# Patient Record
Sex: MALE | Race: WHITE | NOT HISPANIC OR LATINO | Employment: OTHER | URBAN - METROPOLITAN AREA
[De-identification: names, ages, dates, MRNs, and addresses within clinical notes are randomized per-mention and may not be internally consistent; named-entity substitution may affect disease eponyms.]

---

## 2023-07-10 LAB
CANCER AG 19-9 (U/ML) IN SER/PLAS: 16.9 U/ML
CARCINOEMBRYONIC AG (NG/ML) IN SER/PLAS: <0.5 UG/L

## 2023-09-06 LAB
ALANINE AMINOTRANSFERASE (SGPT) (U/L) IN SER/PLAS: 24 U/L (ref 10–52)
ALBUMIN (G/DL) IN SER/PLAS: 4.5 G/DL (ref 3.4–5)
ALKALINE PHOSPHATASE (U/L) IN SER/PLAS: 48 U/L (ref 33–136)
ANION GAP IN SER/PLAS: 13 MMOL/L (ref 10–20)
APPEARANCE, URINE: NORMAL
ASCORBIC ACID: NORMAL MG/DL
ASPARTATE AMINOTRANSFERASE (SGOT) (U/L) IN SER/PLAS: 24 U/L (ref 9–39)
BASOPHILS (10*3/UL) IN BLOOD BY AUTOMATED COUNT: 0.04 X10E9/L (ref 0–0.1)
BASOPHILS/100 LEUKOCYTES IN BLOOD BY AUTOMATED COUNT: 0.6 % (ref 0–2)
BILIRUBIN TOTAL (MG/DL) IN SER/PLAS: 0.6 MG/DL (ref 0–1.2)
BILIRUBIN, URINE: NORMAL
BLOOD, URINE: NORMAL
CALCIUM (MG/DL) IN SER/PLAS: 9.6 MG/DL (ref 8.6–10.3)
CARBON DIOXIDE, TOTAL (MMOL/L) IN SER/PLAS: 28 MMOL/L (ref 21–32)
CHLORIDE (MMOL/L) IN SER/PLAS: 104 MMOL/L (ref 98–107)
CHOLESTEROL (MG/DL) IN SER/PLAS: 214 MG/DL (ref 0–199)
CHOLESTEROL IN HDL (MG/DL) IN SER/PLAS: 61.8 MG/DL
CHOLESTEROL/HDL RATIO: 3.5
COLOR, URINE: NORMAL
CREATININE (MG/DL) IN SER/PLAS: 0.98 MG/DL (ref 0.5–1.3)
EOSINOPHILS (10*3/UL) IN BLOOD BY AUTOMATED COUNT: 0.06 X10E9/L (ref 0–0.4)
EOSINOPHILS/100 LEUKOCYTES IN BLOOD BY AUTOMATED COUNT: 0.9 % (ref 0–6)
ERYTHROCYTE DISTRIBUTION WIDTH (RATIO) BY AUTOMATED COUNT: 12.9 % (ref 11.5–14.5)
ERYTHROCYTE MEAN CORPUSCULAR HEMOGLOBIN CONCENTRATION (G/DL) BY AUTOMATED: 33.1 G/DL (ref 32–36)
ERYTHROCYTE MEAN CORPUSCULAR VOLUME (FL) BY AUTOMATED COUNT: 88 FL (ref 80–100)
ERYTHROCYTES (10*6/UL) IN BLOOD BY AUTOMATED COUNT: 5.36 X10E12/L (ref 4.5–5.9)
GFR MALE: 77 ML/MIN/1.73M2
GLUCOSE (MG/DL) IN SER/PLAS: 74 MG/DL (ref 74–99)
GLUCOSE, URINE: NORMAL
HEMATOCRIT (%) IN BLOOD BY AUTOMATED COUNT: 47.4 % (ref 41–52)
HEMOGLOBIN (G/DL) IN BLOOD: 15.7 G/DL (ref 13.5–17.5)
IMMATURE GRANULOCYTES/100 LEUKOCYTES IN BLOOD BY AUTOMATED COUNT: 0.4 % (ref 0–0.9)
KETONES, URINE: NORMAL
LDL: 115 MG/DL (ref 0–99)
LEUKOCYTE ESTERASE, URINE: NORMAL
LEUKOCYTES (10*3/UL) IN BLOOD BY AUTOMATED COUNT: 7 X10E9/L (ref 4.4–11.3)
LYMPHOCYTES (10*3/UL) IN BLOOD BY AUTOMATED COUNT: 0.86 X10E9/L (ref 0.8–3)
LYMPHOCYTES/100 LEUKOCYTES IN BLOOD BY AUTOMATED COUNT: 12.3 % (ref 13–44)
MONOCYTES (10*3/UL) IN BLOOD BY AUTOMATED COUNT: 0.64 X10E9/L (ref 0.05–0.8)
MONOCYTES/100 LEUKOCYTES IN BLOOD BY AUTOMATED COUNT: 9.1 % (ref 2–10)
NEUTROPHILS (10*3/UL) IN BLOOD BY AUTOMATED COUNT: 5.37 X10E9/L (ref 1.6–5.5)
NEUTROPHILS/100 LEUKOCYTES IN BLOOD BY AUTOMATED COUNT: 76.7 % (ref 40–80)
NITRITE, URINE: NORMAL
PH, URINE: NORMAL
PLATELETS (10*3/UL) IN BLOOD AUTOMATED COUNT: 182 X10E9/L (ref 150–450)
POTASSIUM (MMOL/L) IN SER/PLAS: 4.3 MMOL/L (ref 3.5–5.3)
PROSTATE SPECIFIC AG (NG/ML) IN SER/PLAS: 0.05 NG/ML (ref 0–4)
PROTEIN TOTAL: 7 G/DL (ref 6.4–8.2)
PROTEIN, URINE: NORMAL
SODIUM (MMOL/L) IN SER/PLAS: 141 MMOL/L (ref 136–145)
SPECIFIC GRAVITY, URINE: NORMAL
THYROTROPIN (MIU/L) IN SER/PLAS BY DETECTION LIMIT <= 0.05 MIU/L: 2.05 MIU/L (ref 0.44–3.98)
TRIGLYCERIDE (MG/DL) IN SER/PLAS: 188 MG/DL (ref 0–149)
UREA NITROGEN (MG/DL) IN SER/PLAS: 22 MG/DL (ref 6–23)
UROBILINOGEN, URINE: NORMAL
VLDL: 38 MG/DL (ref 0–40)

## 2023-09-07 LAB
CANCER AG 19-9 (U/ML) IN SER/PLAS: 14.84 U/ML
CARCINOEMBRYONIC AG (NG/ML) IN SER/PLAS: <0.5 UG/L

## 2024-01-18 RX ORDER — LOSARTAN POTASSIUM 25 MG/1
25 TABLET ORAL DAILY
COMMUNITY
End: 2024-01-19 | Stop reason: SDUPTHER

## 2024-01-18 RX ORDER — TAMSULOSIN HYDROCHLORIDE 0.4 MG/1
0.4 CAPSULE ORAL DAILY
COMMUNITY
End: 2024-01-19 | Stop reason: SDUPTHER

## 2024-01-19 ENCOUNTER — OFFICE VISIT (OUTPATIENT)
Dept: PRIMARY CARE | Facility: CLINIC | Age: 84
End: 2024-01-19
Payer: MEDICARE

## 2024-01-19 VITALS
WEIGHT: 163 LBS | HEART RATE: 66 BPM | DIASTOLIC BLOOD PRESSURE: 80 MMHG | RESPIRATION RATE: 16 BRPM | TEMPERATURE: 97.4 F | BODY MASS INDEX: 24.71 KG/M2 | HEIGHT: 68 IN | SYSTOLIC BLOOD PRESSURE: 122 MMHG

## 2024-01-19 DIAGNOSIS — I10 HYPERTENSION, UNSPECIFIED TYPE: ICD-10-CM

## 2024-01-19 DIAGNOSIS — M79.642 PAIN IN BOTH HANDS: ICD-10-CM

## 2024-01-19 DIAGNOSIS — C78.89 SECONDARY MALIGNANT NEOPLASM OF OTHER DIGESTIVE ORGANS (MULTI): ICD-10-CM

## 2024-01-19 DIAGNOSIS — J40 BRONCHITIS: ICD-10-CM

## 2024-01-19 DIAGNOSIS — M79.641 PAIN IN BOTH HANDS: ICD-10-CM

## 2024-01-19 DIAGNOSIS — J41.1 MUCOPURULENT CHRONIC BRONCHITIS (MULTI): ICD-10-CM

## 2024-01-19 DIAGNOSIS — Z51.0: ICD-10-CM

## 2024-01-19 DIAGNOSIS — C21.2: Primary | ICD-10-CM

## 2024-01-19 DIAGNOSIS — C61 PROSTATE CANCER (MULTI): ICD-10-CM

## 2024-01-19 DIAGNOSIS — I70.0 ATHEROSCLEROSIS OF AORTA (CMS-HCC): ICD-10-CM

## 2024-01-19 DIAGNOSIS — G62.9 NEUROPATHY: ICD-10-CM

## 2024-01-19 PROCEDURE — 99214 OFFICE O/P EST MOD 30 MIN: CPT | Performed by: FAMILY MEDICINE

## 2024-01-19 PROCEDURE — 3074F SYST BP LT 130 MM HG: CPT | Performed by: FAMILY MEDICINE

## 2024-01-19 PROCEDURE — 3079F DIAST BP 80-89 MM HG: CPT | Performed by: FAMILY MEDICINE

## 2024-01-19 PROCEDURE — 1159F MED LIST DOCD IN RCRD: CPT | Performed by: FAMILY MEDICINE

## 2024-01-19 RX ORDER — LEVOFLOXACIN 500 MG/1
500 TABLET, FILM COATED ORAL DAILY
Qty: 10 TABLET | Refills: 0 | Status: SHIPPED | OUTPATIENT
Start: 2024-01-19 | End: 2024-01-29

## 2024-01-19 RX ORDER — TAMSULOSIN HYDROCHLORIDE 0.4 MG/1
0.4 CAPSULE ORAL DAILY
Qty: 90 CAPSULE | Refills: 3 | Status: SHIPPED | OUTPATIENT
Start: 2024-01-19 | End: 2024-01-19 | Stop reason: SDUPTHER

## 2024-01-19 RX ORDER — IBUPROFEN 100 MG/5ML
1000 SUSPENSION, ORAL (FINAL DOSE FORM) ORAL DAILY
COMMUNITY

## 2024-01-19 RX ORDER — TAMSULOSIN HYDROCHLORIDE 0.4 MG/1
0.4 CAPSULE ORAL 2 TIMES DAILY
Qty: 180 CAPSULE | Refills: 3 | Status: SHIPPED | OUTPATIENT
Start: 2024-01-19 | End: 2024-05-01 | Stop reason: SDUPTHER

## 2024-01-19 RX ORDER — LOSARTAN POTASSIUM 25 MG/1
25 TABLET ORAL DAILY
Qty: 90 TABLET | Refills: 3 | Status: SHIPPED | OUTPATIENT
Start: 2024-01-19 | End: 2024-03-05 | Stop reason: WASHOUT

## 2024-01-19 RX ORDER — TITANIUM DIOXIDE, OCTINOXATE, ZINC OXIDE 4.61; 1.6; .78 G/40ML; G/40ML; G/40ML
1 CREAM TOPICAL
COMMUNITY

## 2024-01-19 RX ORDER — MUPIROCIN 20 MG/G
OINTMENT TOPICAL 3 TIMES DAILY
Qty: 22 G | Refills: 0 | Status: SHIPPED | OUTPATIENT
Start: 2024-01-19 | End: 2024-01-29

## 2024-01-19 NOTE — PATIENT INSTRUCTIONS
PRINT OUT PAPER LAB ORDER AND RX FOR PT.  RETURN IN AUGUST 2024 FOR AWV AND ANNUAL WELL VISIT.   GET LABS BEFORE ANNUAL VISIT.    GET XRAY OF RIGHT WRIST  START LEVAQUIN ANTIBIOTIC NOW FOR LEFT SIDED BRONCHITIS.

## 2024-01-19 NOTE — PROGRESS NOTES
Subjective   Patient ID: Eb Byrne is a 83 y.o. male who presents for No chief complaint on file..  Here for refills   COUGHING UP GREY PHLEGM X 4 WEEKS NOW.   FLU SHOT: NO  COVID IUTD. LAST TESTED 2 WEEKS AGO.    RSV: NO.     WRITTEN NOTE TO NOT SEND ANY RX TO PHARMACY.  HARD COPY RX ONLY.   PT HAS BEEN ON HIGH DOSE LEVAQUIN 750 IN PAST.    NATHAN TAMSULOSIN  MUPIROCIN OINTMENT 2% GLANS PENIS   WANTS CANCER SCREENING TESTS AND CBC.      ROS:  WHEEZING X 2 DAYS.      Med Refill        Review of Systems   Constitutional:         BURNING AND STABBING PAIN IN THE RT THUMB, HAND, INDEX FINGERS BILAT AND IN FEET.  COULD BE GOUT OR NEUROPATHY VS IMPINGEMENT vs OTHER.   All other systems reviewed and are negative.      Objective   Physical Exam  Vitals and nursing note reviewed.   Constitutional:       Appearance: Normal appearance.   HENT:      Head: Normocephalic.      Right Ear: Tympanic membrane, ear canal and external ear normal.      Left Ear: Tympanic membrane, ear canal and external ear normal.      Nose: Nose normal.      Mouth/Throat:      Mouth: Mucous membranes are moist.   Eyes:      Conjunctiva/sclera: Conjunctivae normal.      Pupils: Pupils are equal, round, and reactive to light.   Cardiovascular:      Rate and Rhythm: Normal rate and regular rhythm.      Pulses: Normal pulses.      Heart sounds: Normal heart sounds.      Comments: SYMMETRIC WRIST PULSES ULNAR AND RADIAL ARTERIES.    Pulmonary:      Effort: Pulmonary effort is normal.      Breath sounds: Normal breath sounds.      Comments: E/A CHANGES AT WHITNEY DELGADO  Abdominal:      General: Bowel sounds are normal.      Palpations: Abdomen is soft.      Comments: NO HARSHA   Musculoskeletal:         General: Normal range of motion.      Cervical back: Normal range of motion and neck supple.   Skin:     General: Skin is warm and dry.   Neurological:      General: No focal deficit present.      Comments: STEPS UP TO TABLE NO TROUBLES,  STUMBLES BUT CATCHES  HIMSELF COMING DOWN.     Psychiatric:         Mood and Affect: Mood normal.         Assessment/Plan   Diagnoses and all orders for this visit:  Malignant neoplasm of cloacogenic zone (CMS/HCC)  -     tamsulosin (Flomax) 0.4 mg 24 hr capsule; Take 1 capsule (0.4 mg) by mouth once daily.  Mucopurulent chronic bronchitis (CMS/HCC)  -     levoFLOXacin (Levaquin) 500 mg tablet; Take 1 tablet (500 mg) by mouth once daily for 10 days.  Atherosclerosis of aorta (CMS/HCC)  Hypertension, unspecified type  -     losartan (Cozaar) 25 mg tablet; Take 1 tablet (25 mg) by mouth once daily.  Prostate cancer (CMS/HCC)  -     tamsulosin (Flomax) 0.4 mg 24 hr capsule; Take 1 capsule (0.4 mg) by mouth once daily.  DXT (radiotherapy)             .VS

## 2024-01-26 ENCOUNTER — LAB (OUTPATIENT)
Dept: LAB | Facility: LAB | Age: 84
End: 2024-01-26
Payer: MEDICARE

## 2024-01-26 DIAGNOSIS — C21.2: ICD-10-CM

## 2024-01-26 DIAGNOSIS — I70.0 ATHEROSCLEROSIS OF AORTA (CMS-HCC): ICD-10-CM

## 2024-01-26 DIAGNOSIS — J41.1 MUCOPURULENT CHRONIC BRONCHITIS (MULTI): ICD-10-CM

## 2024-01-26 DIAGNOSIS — C78.89 SECONDARY MALIGNANT NEOPLASM OF OTHER DIGESTIVE ORGANS (MULTI): ICD-10-CM

## 2024-01-26 DIAGNOSIS — Z51.0: ICD-10-CM

## 2024-01-26 DIAGNOSIS — I10 HYPERTENSION, UNSPECIFIED TYPE: ICD-10-CM

## 2024-01-26 DIAGNOSIS — C61 PROSTATE CANCER (MULTI): ICD-10-CM

## 2024-01-26 LAB — CANCER AG19-9 SERPL-ACNC: 12.74 U/ML

## 2024-01-26 PROCEDURE — 86301 IMMUNOASSAY TUMOR CA 19-9: CPT

## 2024-01-26 PROCEDURE — 36415 COLL VENOUS BLD VENIPUNCTURE: CPT

## 2024-02-07 DIAGNOSIS — J41.1 MUCOPURULENT CHRONIC BRONCHITIS (MULTI): ICD-10-CM

## 2024-02-08 RX ORDER — LEVOFLOXACIN 500 MG/1
TABLET, FILM COATED ORAL
Qty: 10 TABLET | Refills: 0 | OUTPATIENT
Start: 2024-02-08

## 2024-02-09 ENCOUNTER — PATIENT MESSAGE (OUTPATIENT)
Dept: PRIMARY CARE | Facility: CLINIC | Age: 84
End: 2024-02-09
Payer: MEDICARE

## 2024-02-09 DIAGNOSIS — J41.1 MUCOPURULENT CHRONIC BRONCHITIS (MULTI): Primary | ICD-10-CM

## 2024-02-09 RX ORDER — LEVOFLOXACIN 500 MG/1
TABLET, FILM COATED ORAL
Qty: 10 TABLET | Refills: 0 | Status: SHIPPED | OUTPATIENT
Start: 2024-02-09 | End: 2024-03-05 | Stop reason: WASHOUT

## 2024-02-09 RX ORDER — ALBUTEROL SULFATE 90 UG/1
2 AEROSOL, METERED RESPIRATORY (INHALATION) EVERY 4 HOURS PRN
Qty: 8.5 G | Refills: 0 | Status: SHIPPED | OUTPATIENT
Start: 2024-02-09 | End: 2025-02-08

## 2024-02-19 ENCOUNTER — OFFICE VISIT (OUTPATIENT)
Dept: PRIMARY CARE | Facility: CLINIC | Age: 84
End: 2024-02-19
Payer: MEDICARE

## 2024-02-19 VITALS
SYSTOLIC BLOOD PRESSURE: 140 MMHG | HEART RATE: 87 BPM | TEMPERATURE: 96.9 F | OXYGEN SATURATION: 94 % | RESPIRATION RATE: 16 BRPM | DIASTOLIC BLOOD PRESSURE: 84 MMHG

## 2024-02-19 DIAGNOSIS — I49.9 CARDIAC ARRHYTHMIA, UNSPECIFIED CARDIAC ARRHYTHMIA TYPE: ICD-10-CM

## 2024-02-19 DIAGNOSIS — I70.0 ATHEROSCLEROSIS OF AORTA (CMS-HCC): Primary | ICD-10-CM

## 2024-02-19 DIAGNOSIS — J41.1 MUCOPURULENT CHRONIC BRONCHITIS (MULTI): Primary | ICD-10-CM

## 2024-02-19 PROCEDURE — 1160F RVW MEDS BY RX/DR IN RCRD: CPT | Performed by: FAMILY MEDICINE

## 2024-02-19 PROCEDURE — 93000 ELECTROCARDIOGRAM COMPLETE: CPT | Performed by: FAMILY MEDICINE

## 2024-02-19 PROCEDURE — 1159F MED LIST DOCD IN RCRD: CPT | Performed by: FAMILY MEDICINE

## 2024-02-19 PROCEDURE — 99214 OFFICE O/P EST MOD 30 MIN: CPT | Performed by: FAMILY MEDICINE

## 2024-02-19 RX ORDER — AMOXICILLIN 875 MG/1
875 TABLET, FILM COATED ORAL 2 TIMES DAILY
Qty: 20 TABLET | Refills: 0 | Status: SHIPPED | OUTPATIENT
Start: 2024-02-19 | End: 2024-03-05 | Stop reason: WASHOUT

## 2024-02-19 ASSESSMENT — ENCOUNTER SYMPTOMS: COUGH: 1

## 2024-02-19 NOTE — PATIENT INSTRUCTIONS
1--REFER TO CARDIOLOGY FOR ABNORMAL HEART RHYTHM.   2--GET GET CHEST X-RAY. AND PULMONARY FUNCTION TESTING.   3--KEEP ON INHALER MEDICATIONS.    4--CAUTION:  GOOD RX CAN GIVE GOOD PRICE YET LOSE SAFETY OVERSIGHT AND CLEARING HOUSE EFFECT.    5--LABS

## 2024-02-19 NOTE — PROGRESS NOTES
Subjective   Patient ID: Eb Byrne is a 83 y.o. male who presents for Cough (STILL HAS COUGH ).  STILL HAS NUISANCE TYPE COUGH.   RAN OUT OF MEDICATION.   THE 4TH BRONCHITIS.    ? DDX:  ASTHMA ALLERGIES GERD MED SFX vs OTHER.      Cough    PT IS NOT USING INHALER ALBUTEROL   SAYS HE NEEDS 4X LEVAQUIN TO CLEAR THINGS.    NO ACE BUT OFF LOSARTAN?  ASKS FOR GOOD RX PAPER RX.    STOPPED FOR COUGH.  NO ACEI NOT ARB.      1--RESTART THE ARB  2--REFILL THE ABX BUT RISK OF QTC CHANGES ON Fqs.   PERHAPS STILL SLOWLY RECOVERING FROM WHITNEY PROCESS/URI.    3--ABNL HEART RHYTHM TODAY:  ECG:  VECs, LAE; SINUS 89  FQ COULD CONTRIBUTE TO THIS CHANGE AND  [id number].      Review of Systems   Respiratory:  Positive for cough.    All other systems reviewed and are negative.      Objective   Physical Exam  Vitals and nursing note reviewed.   Constitutional:       Appearance: Normal appearance.      Comments: Pt looks well today and clearing throat cough at start of visit,  dot of blood vessesl umbo rt tm and vessels at bony landmarks left tm o/w clear heent,  CVS:  3 BEATS THEN A PAUSE CONSISTENTLY, e/a changes at WHITNEY noted but NO dep edema on exam o/w clear abd neuro skin.     HENT:      Head: Normocephalic.      Right Ear: Ear canal and external ear normal.      Left Ear: Ear canal and external ear normal.      Nose: Nose normal.      Mouth/Throat:      Mouth: Mucous membranes are dry.   Eyes:      Conjunctiva/sclera: Conjunctivae normal.      Pupils: Pupils are equal, round, and reactive to light.   Cardiovascular:      Rate and Rhythm: Normal rate. Rhythm irregular.      Heart sounds: Normal heart sounds. No murmur heard.  Pulmonary:      Effort: Pulmonary effort is normal.      Breath sounds: Normal breath sounds.   Abdominal:      General: Bowel sounds are normal.      Palpations: Abdomen is soft.   Musculoskeletal:         General: Normal range of motion.      Cervical back: Normal range of motion and neck supple.    Skin:     General: Skin is warm and dry.   Neurological:      General: No focal deficit present.   Psychiatric:         Thought Content: Thought content normal.         Assessment/Plan   Diagnoses and all orders for this visit:  Mucopurulent chronic bronchitis (CMS/HCC)  -     XR chest 2 views; Future  -     Complete Pulmonary Function Test (Spirometry/DLCO/Lung Volumes); Future  Cardiac arrhythmia, unspecified cardiac arrhythmia type  -     ECG 12 lead (Clinic Performed)             .VS

## 2024-03-05 ENCOUNTER — OFFICE VISIT (OUTPATIENT)
Dept: CARDIOLOGY | Facility: CLINIC | Age: 84
End: 2024-03-05
Payer: MEDICARE

## 2024-03-05 ENCOUNTER — HOSPITAL ENCOUNTER (OUTPATIENT)
Dept: CARDIOLOGY | Facility: CLINIC | Age: 84
Discharge: HOME | End: 2024-03-05
Payer: MEDICARE

## 2024-03-05 VITALS
BODY MASS INDEX: 24.32 KG/M2 | HEIGHT: 68 IN | DIASTOLIC BLOOD PRESSURE: 75 MMHG | WEIGHT: 160.5 LBS | SYSTOLIC BLOOD PRESSURE: 132 MMHG | HEART RATE: 77 BPM

## 2024-03-05 DIAGNOSIS — I49.3 PVC (PREMATURE VENTRICULAR CONTRACTION): ICD-10-CM

## 2024-03-05 DIAGNOSIS — I49.9 CARDIAC ARRHYTHMIA, UNSPECIFIED CARDIAC ARRHYTHMIA TYPE: Primary | ICD-10-CM

## 2024-03-05 DIAGNOSIS — I49.9 CARDIAC ARRHYTHMIA, UNSPECIFIED CARDIAC ARRHYTHMIA TYPE: ICD-10-CM

## 2024-03-05 PROBLEM — I10 ESSENTIAL HYPERTENSION: Status: ACTIVE | Noted: 2024-03-05

## 2024-03-05 PROCEDURE — 93244 EXT ECG>48HR<7D REV&INTERPJ: CPT | Performed by: INTERNAL MEDICINE

## 2024-03-05 PROCEDURE — 3078F DIAST BP <80 MM HG: CPT | Performed by: INTERNAL MEDICINE

## 2024-03-05 PROCEDURE — 99213 OFFICE O/P EST LOW 20 MIN: CPT | Mod: 25 | Performed by: INTERNAL MEDICINE

## 2024-03-05 PROCEDURE — 93242 EXT ECG>48HR<7D RECORDING: CPT

## 2024-03-05 PROCEDURE — 3075F SYST BP GE 130 - 139MM HG: CPT | Performed by: INTERNAL MEDICINE

## 2024-03-05 PROCEDURE — 99203 OFFICE O/P NEW LOW 30 MIN: CPT | Performed by: INTERNAL MEDICINE

## 2024-03-05 PROCEDURE — 1159F MED LIST DOCD IN RCRD: CPT | Performed by: INTERNAL MEDICINE

## 2024-03-05 PROCEDURE — 1160F RVW MEDS BY RX/DR IN RCRD: CPT | Performed by: INTERNAL MEDICINE

## 2024-03-05 NOTE — PROGRESS NOTES
"Chief Complaint:   PVC's     History of Present Illness     Eb Byrne is a 83 y.o. male presenting with PVC's.  No palpitations.  ECG 2/19/24 frequent unifocal PVC's, ASX.  No cardiac Hx.  CAC=2 in 2023.  No angina or GARCIA. No Hx syncope. No HTN, HPL, DM, non-smoker.  EtOH social.  Caffeine none. Was being Rx for URI.       Previous History     Past Medical History:  He has a past medical history of Anxiety disorder, unspecified (07/10/2015), Benign prostatic hyperplasia without lower urinary tract symptoms (07/17/2017), Disorder of prostate, unspecified (03/09/2015), Essential hypertension (03/05/2024), Fear of flying (01/27/2015), Insomnia, unspecified (10/09/2017), and PVC (premature ventricular contraction) (03/05/2024).    Past Surgical History:  He has a past surgical history that includes Prostate surgery (06/09/2014); Hernia repair (02/16/2017); and Colonoscopy (11/30/2017).      Social History:  He reports that he has quit smoking. His smoking use included cigarettes. He does not have any smokeless tobacco history on file. No history on file for alcohol use and drug use.    Family History:  No family history on file.     Allergies:  Patient has no known allergies.    Outpatient Medications:  Current Outpatient Medications   Medication Instructions    albuterol (ProAir HFA) 90 mcg/actuation inhaler 2 puffs, inhalation, Every 4 hours PRN    ascorbic acid (VITAMIN C) 1,000 mg, oral, Daily    cranberry 400 mg capsule 1 capsule, oral, Daily RT    levoFLOXacin (Levaquin) 500 mg tablet TAKE 1 TABLET BY MOUTH EVERY DAY FOR 10 DAYS    losartan (COZAAR) 25 mg, oral, Daily    mv-min-folic-K1-lycopen-lutein 188--300 mcg tablet 1 tablet, oral, Daily    tamsulosin (FLOMAX) 0.4 mg, oral, 2 times daily    ZINC ACETATE ORAL 1 tablet, oral, Daily       Physical Examination   Vitals:  Visit Vitals  /75 (BP Location: Right arm, Patient Position: Sitting, BP Cuff Size: Adult)   Pulse 77   Ht 1.727 m (5' 8\") "   Wt 72.8 kg (160 lb 8 oz)   BMI 24.40 kg/m²   Smoking Status Former   BSA 1.87 m²      Physical Exam  Vitals reviewed.   Constitutional:       General: He is not in acute distress.     Appearance: Normal appearance.   HENT:      Head: Normocephalic and atraumatic.      Nose: Nose normal.   Eyes:      Conjunctiva/sclera: Conjunctivae normal.   Cardiovascular:      Rate and Rhythm: Normal rate and regular rhythm.      Pulses: Normal pulses.      Heart sounds: No murmur heard.  Pulmonary:      Effort: Pulmonary effort is normal. No respiratory distress.      Breath sounds: Normal breath sounds. No wheezing, rhonchi or rales.   Abdominal:      General: Bowel sounds are normal. There is no distension.      Palpations: Abdomen is soft.      Tenderness: There is no abdominal tenderness.   Musculoskeletal:         General: No swelling.      Right lower leg: No edema.      Left lower leg: No edema.   Skin:     General: Skin is warm and dry.      Capillary Refill: Capillary refill takes less than 2 seconds.   Neurological:      General: No focal deficit present.      Mental Status: He is alert.   Psychiatric:         Mood and Affect: Mood normal.            Labs/Imaging/Cardiac Studies     Last Labs:  CBC -  Lab Results   Component Value Date    WBC 7.0 09/06/2023    HGB 15.7 09/06/2023    HCT 47.4 09/06/2023    MCV 88 09/06/2023     09/06/2023       CMP -  Lab Results   Component Value Date    CALCIUM 9.6 09/06/2023    PROT 7.0 09/06/2023    ALBUMIN 4.5 09/06/2023    AST 24 09/06/2023    ALT 24 09/06/2023    ALKPHOS 48 09/06/2023    BILITOT 0.6 09/06/2023       LIPID PANEL -   Lab Results   Component Value Date    CHOL 214 (H) 09/06/2023    HDL 61.8 09/06/2023    CHHDL 3.5 09/06/2023    VLDL 38 09/06/2023    TRIG 188 (H) 09/06/2023       RENAL FUNCTION PANEL -   Lab Results   Component Value Date    K 4.3 09/06/2023       Lab Results   Component Value Date    HGBA1C 5.4 09/16/2022    HGBA1C 5.1 05/16/2022        ECG:    Echo:  No echocardiogram results found for the past 12 months       Assessment and Recommendations     Assessment/Plan   1. PVC (premature ventricular contraction)  Frequent ASX PVC's on recent ECG.  Holter x 7 days.  If frequent, then Echo.       Perry Hartmann MD    Exclusive of any other services or procedures performed, I, Perry Hartmann MD , spent 30 minutes in duration for this visit today.  This time consisted of chart review, obtaining history, and/or performing the exam as documented above as well as documenting the clinical information for the encounter in the electronic record, discussing treatment options, plans, and/or goals with patient, family, and/or caregiver, refilling medications, updating the electronic record, ordering medicines, lab work, imaging, referrals, and/or procedures as documented above and communicating with other [facility] professionals. I have discussed the results of laboratory, radiology, and cardiology studies with the patient and their family/caregiver.

## 2024-03-18 LAB — BODY SURFACE AREA: 1.87 M2

## 2024-03-20 DIAGNOSIS — I47.29 NSVT (NONSUSTAINED VENTRICULAR TACHYCARDIA) (MULTI): Primary | ICD-10-CM

## 2024-03-20 NOTE — TELEPHONE ENCOUNTER
----- Message from Perry Hartmann MD sent at 3/20/2024 11:27 AM EDT -----  Holter NSVT (7 beats), Frequent PVC's (4%) - Start Toprol XL 25 every day and do stress echo.  ----- Message -----  From: Perry Hartmann MD  Sent: 3/18/2024   2:57 PM EDT  To: Perry Hartmann MD

## 2024-03-21 RX ORDER — METOPROLOL SUCCINATE 25 MG/1
25 TABLET, EXTENDED RELEASE ORAL DAILY
Qty: 30 TABLET | Refills: 3 | Status: SHIPPED | OUTPATIENT
Start: 2024-03-21 | End: 2024-05-01 | Stop reason: SDUPTHER

## 2024-03-21 NOTE — TELEPHONE ENCOUNTER
Spoke with pt and pt agreed to taking metoprolol and verbalized understanding. Pt cannot walk on treadmill for a stress echo. Pt would like only an echo if possible but if you need a stress test done pt will have to be [place]. Please advise. Thank you

## 2024-05-01 ENCOUNTER — OFFICE VISIT (OUTPATIENT)
Dept: PRIMARY CARE | Facility: CLINIC | Age: 84
End: 2024-05-01
Payer: MEDICARE

## 2024-05-01 VITALS
OXYGEN SATURATION: 95 % | SYSTOLIC BLOOD PRESSURE: 133 MMHG | RESPIRATION RATE: 16 BRPM | TEMPERATURE: 97.4 F | HEART RATE: 62 BPM | DIASTOLIC BLOOD PRESSURE: 79 MMHG | WEIGHT: 161 LBS | BODY MASS INDEX: 24.48 KG/M2

## 2024-05-01 DIAGNOSIS — I47.29 NSVT (NONSUSTAINED VENTRICULAR TACHYCARDIA) (MULTI): ICD-10-CM

## 2024-05-01 DIAGNOSIS — C21.2: ICD-10-CM

## 2024-05-01 DIAGNOSIS — G62.9 NEUROPATHY: ICD-10-CM

## 2024-05-01 DIAGNOSIS — I10 HYPERTENSION, UNSPECIFIED TYPE: ICD-10-CM

## 2024-05-01 DIAGNOSIS — I70.0 ATHEROSCLEROSIS OF AORTA (CMS-HCC): Primary | ICD-10-CM

## 2024-05-01 DIAGNOSIS — I49.9 CARDIAC ARRHYTHMIA, UNSPECIFIED CARDIAC ARRHYTHMIA TYPE: ICD-10-CM

## 2024-05-01 DIAGNOSIS — C61 PROSTATE CANCER (MULTI): ICD-10-CM

## 2024-05-01 DIAGNOSIS — C78.89 SECONDARY MALIGNANT NEOPLASM OF OTHER DIGESTIVE ORGANS (MULTI): ICD-10-CM

## 2024-05-01 DIAGNOSIS — Z51.0: ICD-10-CM

## 2024-05-01 PROCEDURE — 1160F RVW MEDS BY RX/DR IN RCRD: CPT | Performed by: FAMILY MEDICINE

## 2024-05-01 PROCEDURE — 3078F DIAST BP <80 MM HG: CPT | Performed by: FAMILY MEDICINE

## 2024-05-01 PROCEDURE — 99213 OFFICE O/P EST LOW 20 MIN: CPT | Performed by: FAMILY MEDICINE

## 2024-05-01 PROCEDURE — 1159F MED LIST DOCD IN RCRD: CPT | Performed by: FAMILY MEDICINE

## 2024-05-01 PROCEDURE — 3075F SYST BP GE 130 - 139MM HG: CPT | Performed by: FAMILY MEDICINE

## 2024-05-01 RX ORDER — METOPROLOL SUCCINATE 25 MG/1
25 TABLET, EXTENDED RELEASE ORAL DAILY
Qty: 90 TABLET | Refills: 3 | Status: SHIPPED | OUTPATIENT
Start: 2024-05-01 | End: 2025-05-01

## 2024-05-01 RX ORDER — TAMSULOSIN HYDROCHLORIDE 0.4 MG/1
0.4 CAPSULE ORAL 2 TIMES DAILY
Qty: 180 CAPSULE | Refills: 3 | Status: SHIPPED | OUTPATIENT
Start: 2024-05-01 | End: 2025-05-01

## 2024-05-01 ASSESSMENT — ENCOUNTER SYMPTOMS
RESPIRATORY NEGATIVE: 1
PSYCHIATRIC NEGATIVE: 1
ENDOCRINE NEGATIVE: 1
NEUROLOGICAL NEGATIVE: 1
CARDIOVASCULAR NEGATIVE: 1
MUSCULOSKELETAL NEGATIVE: 1
EYES NEGATIVE: 1
CONSTITUTIONAL NEGATIVE: 1
GASTROINTESTINAL NEGATIVE: 1
HEMATOLOGIC/LYMPHATIC NEGATIVE: 1
ALLERGIC/IMMUNOLOGIC NEGATIVE: 1

## 2024-05-01 NOTE — PATIENT INSTRUCTIONS
Awv ? DUE JUNE OR SEPT 2024    GET THE CANCER MARKERS: CEA AND  NOW.      ROUTINE LABS WILL BE ORDERED SEPARATELY FOR SEPTEMBER 2024.      Look for and complete PREVNAR 20 STREP PNEUMONIA VACCINATION.

## 2024-05-01 NOTE — PROGRESS NOTES
Subjective   Patient ID: Eb Byrne is a 83 y.o. male who presents for Med Refill.  Med Refill      NEEDS REFILLS TO [facility] [address]   PAPER IN HAND.    ASKS FOR THE 2 BLOOD TESTS.      INTERVAL:   Message from Perry Hartmann MD sent at 3/20/2024 11:27 AM EDT -----Holter NSVT (7 beats), Frequent PVC's (4%) - Start Toprol XL 25 every day and do stress echo.    PT DOES NOT WANT TO DO THE STRESS TEST BUT I ENCOURAGED COMPLIANCE.        Review of Systems   Constitutional: Negative.    HENT: Negative.     Eyes: Negative.    Respiratory: Negative.     Cardiovascular: Negative.    Gastrointestinal: Negative.    Endocrine: Negative.    Genitourinary: Negative.         NOCTURIA X4   Musculoskeletal: Negative.    Skin: Negative.    Allergic/Immunologic: Negative.    Neurological: Negative.    Hematological: Negative.    Psychiatric/Behavioral: Negative.       OCCASIONAL BOWEL BLOCKAGE RELIEF WITH MAG CITRATE AND 6-7 HRS LATER ALL CLEAR.      Objective   Physical Exam  Vitals and nursing note reviewed.   Constitutional:       Appearance: Normal appearance.   HENT:      Head: Normocephalic.      Right Ear: Tympanic membrane, ear canal and external ear normal.      Left Ear: Tympanic membrane, ear canal and external ear normal.      Nose: Nose normal.      Mouth/Throat:      Mouth: Mucous membranes are moist.      Pharynx: Oropharynx is clear.   Eyes:      Conjunctiva/sclera: Conjunctivae normal.      Pupils: Pupils are equal, round, and reactive to light.   Cardiovascular:      Rate and Rhythm: Normal rate and regular rhythm.      Pulses: Normal pulses.      Heart sounds: Normal heart sounds.   Pulmonary:      Effort: Pulmonary effort is normal.      Breath sounds: Normal breath sounds.   Abdominal:      General: Bowel sounds are normal.      Palpations: Abdomen is soft.   Musculoskeletal:         General: Normal range of motion.      Cervical back: Normal range of motion and neck supple.   Skin:     General: Skin  is warm and dry.   Neurological:      General: No focal deficit present.      Mental Status: He is oriented to person, place, and time. Mental status is at baseline.   Psychiatric:         Mood and Affect: Mood normal.         Behavior: Behavior normal.         Thought Content: Thought content normal.         Judgment: Judgment normal.         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 Record:  [id number] 19 [employer]   Health Maintenance:  Colonoscopy - (2017) 2017-DR BUCKLEY     Flu Shot - REFUSED FLU SHOT   J&J Covid Vax - (5/15/2021) DONE MAY 2021  Pneumovax - TBA    HgbA1C Screening - (2022) may 2022: a1c: 5.1%      Bone Density Test - HX OF PROSTATE CANCER MAY NEED DEXA   Covid Vax - X1  cit-6 minicog test - (2023) :  missed one point:       Medical Problems:  Hypothyroidism, Anxiety, Dermatitis, Diverticulitis, Hematuria, Hypertension, Insomnia, Jejunitis, Erectile Dysfunction, Mild Vit D Deficiency, Prostate Carcinoma, Prostate Hypertrophy, PSA elevation, Radiation Injury, Fear Of Flying  Surgical Hx:  Inguinal Hernia Repair - 02/15/2017 DR HERNANDEZ   Prostate Surgery  Radiation TX - () UROLOGIST:  DR RUDD; HX OF HERNIA DR HERNANDEZ TREATED THAT. S/P RADIATION TX FOR PROSTATE CANCER     FH:  Son 1: FHX:  SON MED INDUCED COMA ETT Dx: SZ: ON LOTS OF MEDS [facility]....  FAMILY HX OF TYPE 2 DM ;  LONGEVITY     SH:  Occupation: Unemployed.Sleep: Reports continuity disturbances - PT WAKES UP  ABOUT 4 TIMES A NIGHT.Hand Dominance: Right-handed.  Personal Habits:  Cigarette Use: 3PPD Age 18 To 40 Yrs - (2019) QUIT 30 YR AGO , Former Cigarette Smoker.Alcohol: Occasional Alcoholic Beverage.Daily Caffeine: Daily Caffeine Use.Exercise Type: Exercises sporadically.

## 2024-05-16 ENCOUNTER — TELEPHONE (OUTPATIENT)
Dept: CARDIOLOGY | Facility: CLINIC | Age: 84
End: 2024-05-16
Payer: MEDICARE

## 2024-05-16 DIAGNOSIS — I49.3 PVC (PREMATURE VENTRICULAR CONTRACTION): Primary | ICD-10-CM

## 2024-05-16 DIAGNOSIS — I70.0 ATHEROSCLEROSIS OF AORTA (CMS-HCC): ICD-10-CM

## 2024-05-22 ENCOUNTER — HOSPITAL ENCOUNTER (OUTPATIENT)
Dept: CARDIOLOGY | Facility: CLINIC | Age: 84
Discharge: HOME | End: 2024-05-22
Payer: MEDICARE

## 2024-05-22 DIAGNOSIS — I70.0 ATHEROSCLEROSIS OF AORTA (CMS-HCC): ICD-10-CM

## 2024-05-22 DIAGNOSIS — R94.31 ABNORMAL ELECTROCARDIOGRAM (ECG) (EKG): ICD-10-CM

## 2024-05-22 DIAGNOSIS — I49.3 PVC (PREMATURE VENTRICULAR CONTRACTION): ICD-10-CM

## 2024-05-22 LAB
AORTIC VALVE PEAK VELOCITY: 1.66 M/S
AV PEAK GRADIENT: 11 MMHG
AVA (PEAK VEL): 2.38 CM2
EJECTION FRACTION APICAL 4 CHAMBER: 67.3
LEFT ATRIUM VOLUME AREA LENGTH INDEX BSA: 24.6 ML/M2
LEFT VENTRICLE INTERNAL DIMENSION DIASTOLE: 4.07 CM (ref 3.5–6)
LEFT VENTRICULAR OUTFLOW TRACT DIAMETER: 2.03 CM
LV EJECTION FRACTION BIPLANE: 68 %
MITRAL VALVE E/A RATIO: 0.68
MITRAL VALVE E/E' RATIO: 8.23
RIGHT VENTRICLE FREE WALL PEAK S': 12 CM/S
RIGHT VENTRICLE PEAK SYSTOLIC PRESSURE: 33.9 MMHG
TRICUSPID ANNULAR PLANE SYSTOLIC EXCURSION: 2 CM

## 2024-05-22 PROCEDURE — 93306 TTE W/DOPPLER COMPLETE: CPT | Performed by: INTERNAL MEDICINE

## 2024-05-22 PROCEDURE — 93306 TTE W/DOPPLER COMPLETE: CPT

## 2024-06-03 ENCOUNTER — LAB (OUTPATIENT)
Dept: LAB | Facility: LAB | Age: 84
End: 2024-06-03
Payer: MEDICARE

## 2024-06-03 DIAGNOSIS — C78.89 SECONDARY MALIGNANT NEOPLASM OF OTHER DIGESTIVE ORGANS (MULTI): ICD-10-CM

## 2024-06-03 DIAGNOSIS — C61 PROSTATE CANCER (MULTI): ICD-10-CM

## 2024-06-03 DIAGNOSIS — C21.2: ICD-10-CM

## 2024-06-03 LAB — CANCER AG19-9 SERPL-ACNC: 9.28 U/ML

## 2024-06-03 PROCEDURE — 36415 COLL VENOUS BLD VENIPUNCTURE: CPT

## 2024-06-03 PROCEDURE — 86301 IMMUNOASSAY TUMOR CA 19-9: CPT

## 2024-06-12 ENCOUNTER — APPOINTMENT (OUTPATIENT)
Dept: CARDIOLOGY | Facility: CLINIC | Age: 84
End: 2024-06-12
Payer: MEDICARE

## 2024-07-16 ENCOUNTER — APPOINTMENT (OUTPATIENT)
Dept: PRIMARY CARE | Facility: CLINIC | Age: 84
End: 2024-07-16
Payer: MEDICARE

## 2024-07-16 VITALS
RESPIRATION RATE: 16 BRPM | DIASTOLIC BLOOD PRESSURE: 78 MMHG | HEIGHT: 68 IN | WEIGHT: 163 LBS | SYSTOLIC BLOOD PRESSURE: 147 MMHG | BODY MASS INDEX: 24.71 KG/M2 | HEART RATE: 60 BPM

## 2024-07-16 DIAGNOSIS — I10 HYPERTENSION, UNSPECIFIED TYPE: Primary | ICD-10-CM

## 2024-07-16 DIAGNOSIS — I47.29 NSVT (NONSUSTAINED VENTRICULAR TACHYCARDIA) (MULTI): ICD-10-CM

## 2024-07-16 DIAGNOSIS — G44.86 CERVICOGENIC HEADACHE: ICD-10-CM

## 2024-07-16 PROCEDURE — 3078F DIAST BP <80 MM HG: CPT | Performed by: FAMILY MEDICINE

## 2024-07-16 PROCEDURE — 1159F MED LIST DOCD IN RCRD: CPT | Performed by: FAMILY MEDICINE

## 2024-07-16 PROCEDURE — 3077F SYST BP >= 140 MM HG: CPT | Performed by: FAMILY MEDICINE

## 2024-07-16 PROCEDURE — 1160F RVW MEDS BY RX/DR IN RCRD: CPT | Performed by: FAMILY MEDICINE

## 2024-07-16 PROCEDURE — 99214 OFFICE O/P EST MOD 30 MIN: CPT | Performed by: FAMILY MEDICINE

## 2024-07-16 RX ORDER — METOPROLOL SUCCINATE 25 MG/1
25 TABLET, EXTENDED RELEASE ORAL 2 TIMES DAILY
Qty: 180 TABLET | Refills: 3 | Status: SHIPPED | OUTPATIENT
Start: 2024-07-16 | End: 2025-07-16

## 2024-07-16 RX ORDER — MELOXICAM 15 MG/1
15 TABLET ORAL DAILY
Qty: 15 TABLET | Refills: 1 | Status: SHIPPED | OUTPATIENT
Start: 2024-07-16 | End: 2024-07-31

## 2024-07-16 ASSESSMENT — ENCOUNTER SYMPTOMS
OCCASIONAL FEELINGS OF UNSTEADINESS: 0
DEPRESSION: 0
LOSS OF SENSATION IN FEET: 1

## 2024-07-16 NOTE — PATIENT INSTRUCTIONS
INCREASED METOPROLOL SUCCINATE XL 25 / TOPROL XL 25 MG FROM DAILY TO TWICE DAILY TODAY.    USE [employer].  CALL FOR NEEDS [phone].   KEEP ON MEDICATIONS  KEEP SPECIALTY APPOINTMENTS.

## 2024-07-16 NOTE — PROGRESS NOTES
Subjective   Patient ID: Eb Byrne is a 83 y.o. male who presents for Head Injury (Three weeks ago, headaches and stabbing pain in head. ).  Head Injury       Head Injury (Three weeks ago, headaches and stabbing pain in head. ).  NO LOC:  #2 DIFFERENT CARS AND STEEL DOOR FRAME VS HEAD.    DOI:  ? JUNE 2024.      BACK PAINS ONGOING ALSO.      NEUROPATHY THUMBS GO NUMB AND RT > LEFT GRT TOES GO NUMB    Review of Systems   All other systems reviewed and are negative.    SL COUGH ON BP MED ? ACEI ARB ? NO TOPROL XL.  THIS IS UNUSUAL.    NO INCONTINENCE BOWEL OR BLADDER.    SEEN BY [facility]IRO FOR XRAY ? BONE CHIP AT L1-2 PERHAPS.       Objective   Physical Exam  Vitals and nursing note reviewed.   Constitutional:       Appearance: Normal appearance.      Comments: PT APPEARS HIS BASELINE AND CN 2-12 INTACT CLEAR SPEECH AND TM BILAT.  TENSE MM TRAPEZIUS BILAT.  HEENT LUNGS CVS ABD O/W OK.  NO CVAT AND SYMMETRIC REFLEXES.  GOOD THIGH MM STRENGTH.           Assessment/Plan   Diagnoses and all orders for this visit:  Hypertension, unspecified type  -     Follow Up In Primary Care - Medicare Annual; Future  NSVT (nonsustained ventricular tachycardia) (Multi)  -     metoprolol succinate XL (Toprol-XL) 25 mg 24 hr tablet; Take 1 tablet (25 mg) by mouth 2 times a day. Do not crush or chew.  -     Follow Up In Primary Care - Medicare Annual; Future             TRIAL OF MOBIC X 2 WEEKS FOR PAIN RELIEF.    KEEP CHIROPRACTOR APPOINTMENTS.    FOR ANY INCONTINENCE ISSUES WILL GET CT HEAD.

## 2024-08-21 ENCOUNTER — OFFICE VISIT (OUTPATIENT)
Dept: PRIMARY CARE | Facility: CLINIC | Age: 84
End: 2024-08-21
Payer: MEDICARE

## 2024-08-21 VITALS
DIASTOLIC BLOOD PRESSURE: 90 MMHG | BODY MASS INDEX: 24.86 KG/M2 | WEIGHT: 164 LBS | HEIGHT: 68 IN | TEMPERATURE: 97.8 F | SYSTOLIC BLOOD PRESSURE: 130 MMHG | HEART RATE: 61 BPM | OXYGEN SATURATION: 95 %

## 2024-08-21 DIAGNOSIS — J30.9 ALLERGIC RHINITIS, UNSPECIFIED SEASONALITY, UNSPECIFIED TRIGGER: ICD-10-CM

## 2024-08-21 DIAGNOSIS — C78.89 SECONDARY MALIGNANT NEOPLASM OF OTHER DIGESTIVE ORGANS (MULTI): ICD-10-CM

## 2024-08-21 DIAGNOSIS — I70.0 ATHEROSCLEROSIS OF AORTA (CMS-HCC): ICD-10-CM

## 2024-08-21 DIAGNOSIS — C61 PROSTATE CANCER (MULTI): ICD-10-CM

## 2024-08-21 DIAGNOSIS — Z51.0: ICD-10-CM

## 2024-08-21 DIAGNOSIS — J41.1 MUCOPURULENT CHRONIC BRONCHITIS (MULTI): ICD-10-CM

## 2024-08-21 DIAGNOSIS — C21.2: ICD-10-CM

## 2024-08-21 DIAGNOSIS — I10 HYPERTENSION, UNSPECIFIED TYPE: Primary | ICD-10-CM

## 2024-08-21 DIAGNOSIS — E55.9 VITAMIN D DEFICIENCY: ICD-10-CM

## 2024-08-21 PROCEDURE — 1160F RVW MEDS BY RX/DR IN RCRD: CPT | Performed by: FAMILY MEDICINE

## 2024-08-21 PROCEDURE — 3080F DIAST BP >= 90 MM HG: CPT | Performed by: FAMILY MEDICINE

## 2024-08-21 PROCEDURE — 3075F SYST BP GE 130 - 139MM HG: CPT | Performed by: FAMILY MEDICINE

## 2024-08-21 PROCEDURE — 1159F MED LIST DOCD IN RCRD: CPT | Performed by: FAMILY MEDICINE

## 2024-08-21 PROCEDURE — 99214 OFFICE O/P EST MOD 30 MIN: CPT | Performed by: FAMILY MEDICINE

## 2024-08-21 RX ORDER — FLUTICASONE PROPIONATE 50 MCG
1 SPRAY, SUSPENSION (ML) NASAL DAILY
Qty: 16 G | Refills: 11 | Status: SHIPPED | OUTPATIENT
Start: 2024-08-21 | End: 2025-08-21

## 2024-08-21 NOTE — PROGRESS NOTES
"Subjective   Patient ID: Eb Byrne is a 84 y.o. male who presents for Follow-up (Follow up from hitting head).  HPI  Follow-up (Follow up from hitting head).  4 D LATER HIT HIS HEAD AGAIN   3 DAY HEADACHE ACROSS HIS FOREHEAD ALSO.    YELLOW SINUS NASAL discharge FEELS HA IS SINUS HA DRIVEN.    NUMBNESS ACROSS BACK OF HEAD AND PAIN COMES AND GOES.    \"SOME BLURRY VISION NOT A LOT.\"  NO BLOOD THINNERS.      Review of Systems   All other systems reviewed and are negative.  PAIN UNDER RIB CAGE BILAT COMES AND GOES.      Objective   Physical Exam  Vitals and nursing note reviewed.   Constitutional:       Appearance: Normal appearance.   HENT:      Head: Normocephalic.      Right Ear: Tympanic membrane, ear canal and external ear normal.      Left Ear: Tympanic membrane, ear canal and external ear normal.      Nose: Nose normal.      Mouth/Throat:      Mouth: Mucous membranes are moist.      Pharynx: Oropharynx is clear.   Eyes:      Conjunctiva/sclera: Conjunctivae normal.      Pupils: Pupils are equal, round, and reactive to light.   Cardiovascular:      Rate and Rhythm: Normal rate and regular rhythm.      Pulses: Normal pulses.      Heart sounds: Normal heart sounds.   Pulmonary:      Effort: Pulmonary effort is normal.      Breath sounds: Normal breath sounds.   Abdominal:      General: Bowel sounds are normal.      Palpations: Abdomen is soft.   Musculoskeletal:         General: Normal range of motion.      Cervical back: Normal range of motion and neck supple.   Skin:     General: Skin is warm and dry.   Neurological:      General: No focal deficit present.      Mental Status: Mental status is at baseline.   Psychiatric:         Mood and Affect: Mood normal.         Behavior: Behavior normal.         Thought Content: Thought content normal.         Judgment: Judgment normal.     NO CAROTID BRUIT.    CN 2-12 INTACT.  NM INTACT.    REDNESS TO [employer] BILAT.  TM AND OP CLEAR.      Assessment/Plan "   Diagnoses and all orders for this visit:  Hypertension, unspecified type  -     Follow Up In Primary Care - Established  -     Follow Up In Primary Care - Medicare Annual; Future  Malignant neoplasm of cloacogenic zone (Multi)  -     Follow Up In Primary Care - Established  Mucopurulent chronic bronchitis (Multi)  -     Follow Up In Primary Care - Established  Atherosclerosis of aorta (CMS-HCC)  -     Follow Up In Primary Care - Established  Prostate cancer (Multi)  -     Follow Up In Primary Care - Established  DXT (radiotherapy)  -     Follow Up In Primary Care - Established             .VS

## 2025-01-13 ENCOUNTER — OFFICE VISIT (OUTPATIENT)
Dept: PRIMARY CARE | Facility: CLINIC | Age: 85
End: 2025-01-13
Payer: MEDICARE

## 2025-01-13 VITALS
SYSTOLIC BLOOD PRESSURE: 148 MMHG | OXYGEN SATURATION: 95 % | TEMPERATURE: 96.4 F | RESPIRATION RATE: 16 BRPM | DIASTOLIC BLOOD PRESSURE: 96 MMHG

## 2025-01-13 DIAGNOSIS — J40 BRONCHITIS: ICD-10-CM

## 2025-01-13 DIAGNOSIS — J02.9 PHARYNGITIS, UNSPECIFIED ETIOLOGY: Primary | ICD-10-CM

## 2025-01-13 PROCEDURE — 3080F DIAST BP >= 90 MM HG: CPT | Performed by: INTERNAL MEDICINE

## 2025-01-13 PROCEDURE — 3077F SYST BP >= 140 MM HG: CPT | Performed by: INTERNAL MEDICINE

## 2025-01-13 PROCEDURE — 99213 OFFICE O/P EST LOW 20 MIN: CPT | Performed by: INTERNAL MEDICINE

## 2025-01-13 RX ORDER — AMOXICILLIN 875 MG/1
875 TABLET, FILM COATED ORAL 2 TIMES DAILY
Qty: 14 TABLET | Refills: 0 | Status: SHIPPED | OUTPATIENT
Start: 2025-01-13 | End: 2025-01-20

## 2025-01-13 NOTE — PROGRESS NOTES
Subjective   Eb Byrne is a 84 y.o. male who presents for evaluation of symptoms of a URI, bronchitis  . Symptoms include congestion, nasal congestion, no  fever, productive cough with  gray colored sputum, and sore throat. Onset of symptoms was 4 days ago and has been unchanged since that time. Treatment to date:  sinex .  Home Covid test is Negative  Objective   Physical Exam  Constitutional:       Appearance: Normal appearance.   HENT:      Nose: Congestion present.      Mouth/Throat:      Mouth: Mucous membranes are moist.      Pharynx: Oropharynx is clear. No oropharyngeal exudate or posterior oropharyngeal erythema.   Eyes:      Conjunctiva/sclera: Conjunctivae normal.   Cardiovascular:      Rate and Rhythm: Normal rate and regular rhythm.      Heart sounds: Normal heart sounds.   Pulmonary:      Effort: Pulmonary effort is normal.      Breath sounds: Normal breath sounds. No wheezing.   Neurological:      Mental Status: He is alert.          Assessment/Plan   bronchitis and viral upper respiratory illness.    Discussed diagnosis and treatment of URI.  Suggested symptomatic OTC remedies.  Nasal saline spray for congestion.  Follow up as needed.  Amoxil  875 mg bid # 14

## 2025-01-20 ENCOUNTER — TELEPHONE (OUTPATIENT)
Dept: PRIMARY CARE | Facility: CLINIC | Age: 85
End: 2025-01-20
Payer: MEDICARE

## 2025-01-20 DIAGNOSIS — C21.2: ICD-10-CM

## 2025-01-20 DIAGNOSIS — J02.9 PHARYNGITIS, UNSPECIFIED ETIOLOGY: ICD-10-CM

## 2025-01-20 DIAGNOSIS — C61 PROSTATE CANCER (MULTI): ICD-10-CM

## 2025-01-20 DIAGNOSIS — J40 BRONCHITIS: Primary | ICD-10-CM

## 2025-01-20 RX ORDER — ALBUTEROL SULFATE 90 UG/1
2 INHALANT RESPIRATORY (INHALATION) EVERY 4 HOURS PRN
Qty: 6.7 G | Refills: 0 | Status: SHIPPED | OUTPATIENT
Start: 2025-01-20 | End: 2026-01-20

## 2025-01-20 RX ORDER — AMOXICILLIN 875 MG/1
875 TABLET, FILM COATED ORAL 2 TIMES DAILY
Qty: 14 TABLET | Refills: 0 | Status: SHIPPED | OUTPATIENT
Start: 2025-01-20 | End: 2025-01-27

## 2025-01-20 RX ORDER — MONTELUKAST SODIUM 10 MG/1
10 TABLET ORAL NIGHTLY
Qty: 30 TABLET | Refills: 5 | Status: SHIPPED | OUTPATIENT
Start: 2025-01-20 | End: 2025-07-19

## 2025-01-20 NOTE — PROGRESS NOTES
1/20/25:  PT STILL WITH COUGH.    SEEN LAST WEEK DR ANDERSON RX PHARYNGITIS ABX: AMOXIL X 1 WEEK.    NO ACEI  NO INHALER MEDS? ANY VIRAL SWABS?    ? LAST LABS 2023?   NEG HOME COVID TEST.    1--FOR COUGH I WILL REFILL AMOXIL .   2--FLUP APPT SINCE I HAVE NOT SEEN PT SINCE HE FELL AUG 2024.   3--ADDED MONTELUKAST NOT MEDROL.  4--ADDED PROVENTIL MDI   5--CXR FOR HX OF GREY SPUTUM AND HX OF PROSTATE AND GI CANCERS.

## 2025-01-20 NOTE — TELEPHONE ENCOUNTER
You saw pt last week, he finished the abx but is still coughing and wants to know if you can prescribe it again?

## 2025-01-24 DIAGNOSIS — J41.1 MUCOPURULENT CHRONIC BRONCHITIS (MULTI): Primary | ICD-10-CM

## 2025-01-24 NOTE — RESULT ENCOUNTER NOTE
1--PGS CALLS PT:  [phone] FOR UPDATE.  2--CXR ELEVATION LEFT HEMIDIAPHRAGM, CALCIFIED NODULE RT LUNG BASE NO INFILTRATE.    3--PGS CALLED [facility] PT TRC.

## 2025-01-27 ENCOUNTER — TELEPHONE (OUTPATIENT)
Dept: PRIMARY CARE | Facility: CLINIC | Age: 85
End: 2025-01-27
Payer: MEDICARE

## 2025-01-27 RX ORDER — LEVOFLOXACIN 500 MG/1
500 TABLET, FILM COATED ORAL DAILY
Qty: 10 TABLET | Refills: 0 | Status: SHIPPED | OUTPATIENT
Start: 2025-01-27 | End: 2025-02-06

## 2025-01-27 RX ORDER — FLUTICASONE FUROATE, UMECLIDINIUM BROMIDE AND VILANTEROL TRIFENATATE 200; 62.5; 25 UG/1; UG/1; UG/1
1 POWDER RESPIRATORY (INHALATION) DAILY
Qty: 60 EACH | Refills: 11 | Status: SHIPPED | OUTPATIENT
Start: 2025-01-27

## 2025-01-27 RX ORDER — METHYLPREDNISOLONE 4 MG/1
TABLET ORAL
Qty: 21 TABLET | Refills: 0 | Status: SHIPPED | OUTPATIENT
Start: 2025-01-27 | End: 2025-02-02

## 2025-01-27 NOTE — PROGRESS NOTES
PHONE NOTE PT SOUNDS AWFUL 1 MONTH OF COUGH DESPITE #2 ROUNDS OF ABX KEFLEX AND SINGULAIR.    SEE PHONE NOTE.  CONVERSATIONAL DYSPNEA.   LEVAQUIN 500 MG QD X 10 DAYS.    MEDROL  ALBUTEROL MDI NO NEB AT HOME.    EDUC FOR PT IF WORSE GO TO ER.

## 2025-01-27 NOTE — TELEPHONE ENCOUNTER
PT states he just got your vmail yesterday, so calling back to talk to you.  Will be around all day if you can call again. Is out of antibiotics now. Still coughing a lot.

## 2025-02-04 DIAGNOSIS — J41.1 MUCOPURULENT CHRONIC BRONCHITIS (MULTI): ICD-10-CM

## 2025-02-04 RX ORDER — LEVOFLOXACIN 500 MG/1
500 TABLET, FILM COATED ORAL DAILY
Qty: 7 TABLET | Refills: 0 | Status: SHIPPED | OUTPATIENT
Start: 2025-02-04 | End: 2025-02-11

## 2025-02-18 ENCOUNTER — APPOINTMENT (OUTPATIENT)
Dept: PRIMARY CARE | Facility: CLINIC | Age: 85
End: 2025-02-18
Payer: MEDICARE

## 2025-02-27 ENCOUNTER — APPOINTMENT (OUTPATIENT)
Dept: PRIMARY CARE | Facility: CLINIC | Age: 85
End: 2025-02-27
Payer: MEDICARE

## 2025-02-27 VITALS
HEART RATE: 71 BPM | BODY MASS INDEX: 25.09 KG/M2 | OXYGEN SATURATION: 93 % | SYSTOLIC BLOOD PRESSURE: 143 MMHG | TEMPERATURE: 97.3 F | WEIGHT: 165 LBS | DIASTOLIC BLOOD PRESSURE: 89 MMHG

## 2025-02-27 DIAGNOSIS — Z13.1 SCREENING FOR DIABETES MELLITUS: ICD-10-CM

## 2025-02-27 DIAGNOSIS — E55.9 VITAMIN D DEFICIENCY: ICD-10-CM

## 2025-02-27 DIAGNOSIS — C21.2: ICD-10-CM

## 2025-02-27 DIAGNOSIS — N64.4 BREAST TENDERNESS IN MALE: ICD-10-CM

## 2025-02-27 DIAGNOSIS — C61 PROSTATE CANCER (MULTI): ICD-10-CM

## 2025-02-27 DIAGNOSIS — C78.89 SECONDARY MALIGNANT NEOPLASM OF OTHER DIGESTIVE ORGANS (MULTI): Primary | ICD-10-CM

## 2025-02-27 DIAGNOSIS — Z00.01 ENCOUNTER FOR GENERAL ADULT MEDICAL EXAMINATION WITH ABNORMAL FINDINGS: ICD-10-CM

## 2025-02-27 DIAGNOSIS — I47.29 NSVT (NONSUSTAINED VENTRICULAR TACHYCARDIA) (MULTI): ICD-10-CM

## 2025-02-27 DIAGNOSIS — I70.0 ATHEROSCLEROSIS OF AORTA (CMS-HCC): ICD-10-CM

## 2025-02-27 DIAGNOSIS — I21.A9 OTHER MYOCARDIAL INFARCTION TYPE (MULTI): ICD-10-CM

## 2025-02-27 DIAGNOSIS — R97.8 OTHER ABNORMAL TUMOR MARKERS: ICD-10-CM

## 2025-02-27 DIAGNOSIS — R68.89 OTHER GENERAL SYMPTOMS AND SIGNS: ICD-10-CM

## 2025-02-27 PROCEDURE — 1159F MED LIST DOCD IN RCRD: CPT | Performed by: FAMILY MEDICINE

## 2025-02-27 PROCEDURE — G2211 COMPLEX E/M VISIT ADD ON: HCPCS | Performed by: FAMILY MEDICINE

## 2025-02-27 PROCEDURE — 1160F RVW MEDS BY RX/DR IN RCRD: CPT | Performed by: FAMILY MEDICINE

## 2025-02-27 PROCEDURE — 3079F DIAST BP 80-89 MM HG: CPT | Performed by: FAMILY MEDICINE

## 2025-02-27 PROCEDURE — 99214 OFFICE O/P EST MOD 30 MIN: CPT | Performed by: FAMILY MEDICINE

## 2025-02-27 PROCEDURE — 3077F SYST BP >= 140 MM HG: CPT | Performed by: FAMILY MEDICINE

## 2025-02-27 RX ORDER — METOPROLOL SUCCINATE 25 MG/1
25 TABLET, EXTENDED RELEASE ORAL 2 TIMES DAILY
Qty: 180 TABLET | Refills: 3 | Status: SHIPPED | OUTPATIENT
Start: 2025-02-27 | End: 2026-02-27

## 2025-02-27 RX ORDER — TAMSULOSIN HYDROCHLORIDE 0.4 MG/1
0.4 CAPSULE ORAL 2 TIMES DAILY
Qty: 180 CAPSULE | Refills: 3 | Status: SHIPPED | OUTPATIENT
Start: 2025-02-27 | End: 2026-02-27

## 2025-02-27 RX ORDER — IBUPROFEN 100 MG/5ML
1000 SUSPENSION, ORAL (FINAL DOSE FORM) ORAL DAILY
Qty: 90 TABLET | Refills: 3 | Status: SHIPPED | OUTPATIENT
Start: 2025-02-27 | End: 2026-02-27

## 2025-02-27 ASSESSMENT — PATIENT HEALTH QUESTIONNAIRE - PHQ9
1. LITTLE INTEREST OR PLEASURE IN DOING THINGS: NOT AT ALL
SUM OF ALL RESPONSES TO PHQ9 QUESTIONS 1 AND 2: 0
2. FEELING DOWN, DEPRESSED OR HOPELESS: NOT AT ALL

## 2025-02-27 NOTE — PATIENT INSTRUCTIONS
USE [employer].  CALL FOR NEEDS [phone].   KEEP ON MEDICATIONS  KEEP SPECIALTY APPOINTMENTS.    MAKE MEDICARE AW APPOINTMENT FOR SEPT 2025.

## 2025-02-27 NOTE — PROGRESS NOTES
Subjective   Patient ID: Eb Byrne is a 84 y.o. male who presents for Follow-up.    HPI   [facility] APPT FOR REFILLS.    INTERVAL BAD PERSISTENT BRONCHITIS AND GLAD PCP HAD CALLED.  BETTER NOW.        HERE FOR REFILLS BUT NOT OF STIMULANTS:    INTERVAL:   REPORTS BENEFIT ON MED.   SFX:  NO  DIVERSION: NO  CHANGES NEEDED: NO  OARRS:   UDS: tba    .virt  APPT:    ON:   OUT:   DURATION:    RTO:     EXAM: LIMITED VIRTUAL EXAM, APPROPRIATE.        Review of Systems   All other systems reviewed and are negative.  PAIN IN LEFT NIPPLE THIS PAST WEEK NO SXS ON THE RT NO NEW ACTIVITIES NO INJURIES.      Objective   /84   Pulse 71   Temp 36.3 °C (97.3 °F)   Wt 74.8 kg (165 lb)   SpO2 93%   BMI 25.09 kg/m²     Physical Exam  Vitals and nursing note reviewed.   Constitutional:       Appearance: Normal appearance.   HENT:      Head: Normocephalic.      Right Ear: Tympanic membrane, ear canal and external ear normal.      Left Ear: Tympanic membrane, ear canal and external ear normal.      Nose: Nose normal.      Mouth/Throat:      Mouth: Mucous membranes are moist.      Pharynx: Oropharynx is clear.   Eyes:      Conjunctiva/sclera: Conjunctivae normal.      Pupils: Pupils are equal, round, and reactive to light.   Cardiovascular:      Rate and Rhythm: Normal rate and regular rhythm.      Pulses: Normal pulses.      Heart sounds: Normal heart sounds.   Pulmonary:      Effort: Pulmonary effort is normal.      Breath sounds: Normal breath sounds.   Abdominal:      General: Bowel sounds are normal.      Palpations: Abdomen is soft.   Musculoskeletal:         General: Normal range of motion.      Cervical back: Normal range of motion and neck supple.   Skin:     General: Skin is warm and dry.   Neurological:      General: No focal deficit present.      Mental Status: Mental status is at baseline.   Psychiatric:         Mood and Affect: Mood normal.         Behavior: Behavior normal.         Thought Content: Thought content  normal.         Judgment: Judgment normal.         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

## 2025-02-28 DIAGNOSIS — C78.89 SECONDARY MALIGNANT NEOPLASM OF OTHER DIGESTIVE ORGANS (MULTI): ICD-10-CM

## 2025-02-28 DIAGNOSIS — N64.4 BREAST TENDERNESS IN MALE: Primary | ICD-10-CM

## 2025-02-28 NOTE — PROGRESS NOTES
PGS SPOKE W/  RADIOLOGIST AND DIAGNOSTIC MAMMO FOR MALE LEFT BREAST TENDERNESS W/ ADDITIONAL ORDER FOR BREAST ULTRASOUND IF NEEDED.

## 2025-03-06 LAB
25(OH)D3+25(OH)D2 SERPL-MCNC: 70 NG/ML (ref 30–100)
ALBUMIN SERPL-MCNC: 3.8 G/DL (ref 3.6–5.1)
ALP SERPL-CCNC: 78 U/L (ref 35–144)
ALT SERPL-CCNC: 17 U/L (ref 9–46)
ANION GAP SERPL CALCULATED.4IONS-SCNC: 8 MMOL/L (CALC) (ref 7–17)
AST SERPL-CCNC: 18 U/L (ref 10–35)
BASOPHILS # BLD AUTO: 32 CELLS/UL (ref 0–200)
BASOPHILS NFR BLD AUTO: 0.5 %
BILIRUB SERPL-MCNC: 0.6 MG/DL (ref 0.2–1.2)
BUN SERPL-MCNC: 19 MG/DL (ref 7–25)
CALCIUM SERPL-MCNC: 9.2 MG/DL (ref 8.6–10.3)
CHLORIDE SERPL-SCNC: 103 MMOL/L (ref 98–110)
CHOLEST SERPL-MCNC: 174 MG/DL
CHOLEST/HDLC SERPL: 3.1 (CALC)
CO2 SERPL-SCNC: 30 MMOL/L (ref 20–32)
CREAT SERPL-MCNC: 0.98 MG/DL (ref 0.7–1.22)
EGFRCR SERPLBLD CKD-EPI 2021: 77 ML/MIN/1.73M2
EOSINOPHIL # BLD AUTO: 90 CELLS/UL (ref 15–500)
EOSINOPHIL NFR BLD AUTO: 1.4 %
ERYTHROCYTE [DISTWIDTH] IN BLOOD BY AUTOMATED COUNT: 12.3 % (ref 11–15)
EST. AVERAGE GLUCOSE BLD GHB EST-MCNC: 131 MG/DL
EST. AVERAGE GLUCOSE BLD GHB EST-SCNC: 7.3 MMOL/L
ESTRADIOL SERPL-MCNC: 35 PG/ML
FSH SERPL-ACNC: 34.5 MIU/ML (ref 1.4–12.8)
GLUCOSE SERPL-MCNC: 108 MG/DL (ref 65–139)
HBA1C MFR BLD: 6.2 % OF TOTAL HGB
HCT VFR BLD AUTO: 45.6 % (ref 38.5–50)
HDLC SERPL-MCNC: 56 MG/DL
HGB BLD-MCNC: 15 G/DL (ref 13.2–17.1)
LDLC SERPL CALC-MCNC: 92 MG/DL (CALC)
LH SERPL-ACNC: 24.4 MIU/ML (ref 1.6–15.2)
LYMPHOCYTES # BLD AUTO: 870 CELLS/UL (ref 850–3900)
LYMPHOCYTES NFR BLD AUTO: 13.6 %
MCH RBC QN AUTO: 29.2 PG (ref 27–33)
MCHC RBC AUTO-ENTMCNC: 32.9 G/DL (ref 32–36)
MCV RBC AUTO: 88.9 FL (ref 80–100)
MONOCYTES # BLD AUTO: 608 CELLS/UL (ref 200–950)
MONOCYTES NFR BLD AUTO: 9.5 %
NEUTROPHILS # BLD AUTO: 4800 CELLS/UL (ref 1500–7800)
NEUTROPHILS NFR BLD AUTO: 75 %
NONHDLC SERPL-MCNC: 118 MG/DL (CALC)
PLATELET # BLD AUTO: 191 THOUSAND/UL (ref 140–400)
PMV BLD REES-ECKER: 10.1 FL (ref 7.5–12.5)
POTASSIUM SERPL-SCNC: 4.3 MMOL/L (ref 3.5–5.3)
PROT SERPL-MCNC: 6.7 G/DL (ref 6.1–8.1)
PSA SERPL-MCNC: 0.08 NG/ML
RBC # BLD AUTO: 5.13 MILLION/UL (ref 4.2–5.8)
SODIUM SERPL-SCNC: 141 MMOL/L (ref 135–146)
TESTOST FREE SERPL-MCNC: NORMAL PG/ML
TESTOST SERPL-MCNC: NORMAL NG/DL
TRIGL SERPL-MCNC: 166 MG/DL
TSH SERPL-ACNC: 1.89 MIU/L (ref 0.4–4.5)
WBC # BLD AUTO: 6.4 THOUSAND/UL (ref 3.8–10.8)

## 2025-03-12 LAB
25(OH)D3+25(OH)D2 SERPL-MCNC: 70 NG/ML (ref 30–100)
ALBUMIN SERPL-MCNC: 3.8 G/DL (ref 3.6–5.1)
ALP SERPL-CCNC: 78 U/L (ref 35–144)
ALT SERPL-CCNC: 17 U/L (ref 9–46)
ANION GAP SERPL CALCULATED.4IONS-SCNC: 8 MMOL/L (CALC) (ref 7–17)
AST SERPL-CCNC: 18 U/L (ref 10–35)
BASOPHILS # BLD AUTO: 32 CELLS/UL (ref 0–200)
BASOPHILS NFR BLD AUTO: 0.5 %
BILIRUB SERPL-MCNC: 0.6 MG/DL (ref 0.2–1.2)
BUN SERPL-MCNC: 19 MG/DL (ref 7–25)
CALCIUM SERPL-MCNC: 9.2 MG/DL (ref 8.6–10.3)
CHLORIDE SERPL-SCNC: 103 MMOL/L (ref 98–110)
CHOLEST SERPL-MCNC: 174 MG/DL
CHOLEST/HDLC SERPL: 3.1 (CALC)
CO2 SERPL-SCNC: 30 MMOL/L (ref 20–32)
CREAT SERPL-MCNC: 0.98 MG/DL (ref 0.7–1.22)
EGFRCR SERPLBLD CKD-EPI 2021: 77 ML/MIN/1.73M2
EOSINOPHIL # BLD AUTO: 90 CELLS/UL (ref 15–500)
EOSINOPHIL NFR BLD AUTO: 1.4 %
ERYTHROCYTE [DISTWIDTH] IN BLOOD BY AUTOMATED COUNT: 12.3 % (ref 11–15)
EST. AVERAGE GLUCOSE BLD GHB EST-MCNC: 131 MG/DL
EST. AVERAGE GLUCOSE BLD GHB EST-SCNC: 7.3 MMOL/L
ESTRADIOL SERPL-MCNC: 35 PG/ML
FSH SERPL-ACNC: 34.5 MIU/ML (ref 1.4–12.8)
GLUCOSE SERPL-MCNC: 108 MG/DL (ref 65–139)
HBA1C MFR BLD: 6.2 % OF TOTAL HGB
HCT VFR BLD AUTO: 45.6 % (ref 38.5–50)
HDLC SERPL-MCNC: 56 MG/DL
HGB BLD-MCNC: 15 G/DL (ref 13.2–17.1)
LDLC SERPL CALC-MCNC: 92 MG/DL (CALC)
LH SERPL-ACNC: 24.4 MIU/ML (ref 1.6–15.2)
LYMPHOCYTES # BLD AUTO: 870 CELLS/UL (ref 850–3900)
LYMPHOCYTES NFR BLD AUTO: 13.6 %
MCH RBC QN AUTO: 29.2 PG (ref 27–33)
MCHC RBC AUTO-ENTMCNC: 32.9 G/DL (ref 32–36)
MCV RBC AUTO: 88.9 FL (ref 80–100)
MONOCYTES # BLD AUTO: 608 CELLS/UL (ref 200–950)
MONOCYTES NFR BLD AUTO: 9.5 %
NEUTROPHILS # BLD AUTO: 4800 CELLS/UL (ref 1500–7800)
NEUTROPHILS NFR BLD AUTO: 75 %
NONHDLC SERPL-MCNC: 118 MG/DL (CALC)
PLATELET # BLD AUTO: 191 THOUSAND/UL (ref 140–400)
PMV BLD REES-ECKER: 10.1 FL (ref 7.5–12.5)
POTASSIUM SERPL-SCNC: 4.3 MMOL/L (ref 3.5–5.3)
PROT SERPL-MCNC: 6.7 G/DL (ref 6.1–8.1)
PSA SERPL-MCNC: 0.08 NG/ML
RBC # BLD AUTO: 5.13 MILLION/UL (ref 4.2–5.8)
SODIUM SERPL-SCNC: 141 MMOL/L (ref 135–146)
TESTOST FREE SERPL-MCNC: 19.5 PG/ML (ref 30–135)
TESTOST SERPL-MCNC: 201 NG/DL (ref 250–1100)
TRIGL SERPL-MCNC: 166 MG/DL
TSH SERPL-ACNC: 1.89 MIU/L (ref 0.4–4.5)
WBC # BLD AUTO: 6.4 THOUSAND/UL (ref 3.8–10.8)

## 2025-04-16 ENCOUNTER — APPOINTMENT (OUTPATIENT)
Dept: RADIOLOGY | Facility: CLINIC | Age: 85
End: 2025-04-16
Payer: MEDICARE

## 2025-04-17 ENCOUNTER — HOSPITAL ENCOUNTER (OUTPATIENT)
Dept: RADIOLOGY | Facility: CLINIC | Age: 85
Discharge: HOME | End: 2025-04-17
Payer: MEDICARE

## 2025-04-17 DIAGNOSIS — N64.4 BREAST TENDERNESS IN MALE: ICD-10-CM

## 2025-04-17 DIAGNOSIS — C78.89 SECONDARY MALIGNANT NEOPLASM OF OTHER DIGESTIVE ORGANS (MULTI): ICD-10-CM

## 2025-04-17 PROCEDURE — 77062 BREAST TOMOSYNTHESIS BI: CPT

## 2025-04-18 NOTE — RESULT ENCOUNTER NOTE
BENIGN MAMMO FINDINGS:  GOOD NEWS NOT CANCER: IMPRESSION:  Findings compatible with mild bilateral gynecomastia likely the etiology for the patient's symptoms. Clinical follow-up is suggested.  MEDS AND ALCOHOL USE AND/OR OTHER CAUSES FOR MALE FATTY BREAST TISSUE aka GYNECOMASTIA.

## 2025-07-18 ENCOUNTER — APPOINTMENT (OUTPATIENT)
Dept: PRIMARY CARE | Facility: CLINIC | Age: 85
End: 2025-07-18
Payer: MEDICARE

## 2025-07-30 DIAGNOSIS — C61 PROSTATE CANCER (MULTI): ICD-10-CM

## 2025-07-30 DIAGNOSIS — C21.2: ICD-10-CM

## 2025-07-30 RX ORDER — TAMSULOSIN HYDROCHLORIDE 0.4 MG/1
0.4 CAPSULE ORAL 2 TIMES DAILY
Qty: 180 CAPSULE | Refills: 3 | Status: SHIPPED | OUTPATIENT
Start: 2025-07-30 | End: 2025-08-01

## 2025-07-31 ENCOUNTER — TELEPHONE (OUTPATIENT)
Dept: PRIMARY CARE | Facility: CLINIC | Age: 85
End: 2025-07-31
Payer: MEDICARE

## 2025-07-31 DIAGNOSIS — I47.29 NSVT (NONSUSTAINED VENTRICULAR TACHYCARDIA) (MULTI): ICD-10-CM

## 2025-07-31 RX ORDER — METOPROLOL SUCCINATE 25 MG/1
TABLET, EXTENDED RELEASE ORAL
Qty: 180 TABLET | Refills: 3 | Status: SHIPPED | OUTPATIENT
Start: 2025-07-31

## 2025-07-31 NOTE — TELEPHONE ENCOUNTER
Metoprolol ER 35mg tab DISPENSE as WRITTEN MUST BE [employer]     SEND TO [facility]  [phone]     7/31/2025: THE NATHAN INFO IS ON THE RX AND SENT TO [facility].  THE METOPROLOL ER STRENGTHS ARE:  25 mg, 37.5 mg, 50 mg, 75 mg, and 100 mg. NO 35 MG BUT TWICE A DAY 25 MG DOSE IS ORDERED.  LET ME KNOW IF THIS IS OK.

## 2025-08-01 DIAGNOSIS — C61 PROSTATE CANCER (MULTI): ICD-10-CM

## 2025-08-01 DIAGNOSIS — C21.2: ICD-10-CM

## 2025-08-01 RX ORDER — TAMSULOSIN HYDROCHLORIDE 0.4 MG/1
0.4 CAPSULE ORAL 2 TIMES DAILY
Qty: 180 CAPSULE | Refills: 3 | Status: SHIPPED | OUTPATIENT
Start: 2025-08-01